# Patient Record
Sex: FEMALE | Race: BLACK OR AFRICAN AMERICAN | NOT HISPANIC OR LATINO | Employment: UNEMPLOYED | URBAN - METROPOLITAN AREA
[De-identification: names, ages, dates, MRNs, and addresses within clinical notes are randomized per-mention and may not be internally consistent; named-entity substitution may affect disease eponyms.]

---

## 2018-02-05 ENCOUNTER — HOSPITAL ENCOUNTER (EMERGENCY)
Facility: OTHER | Age: 23
Discharge: HOME | End: 2018-02-05
Attending: EMERGENCY MEDICINE
Payer: MEDICAID

## 2018-02-05 VITALS
DIASTOLIC BLOOD PRESSURE: 74 MMHG | OXYGEN SATURATION: 100 % | RESPIRATION RATE: 16 BRPM | TEMPERATURE: 98 F | HEART RATE: 86 BPM | HEIGHT: 60 IN | BODY MASS INDEX: 23.56 KG/M2 | SYSTOLIC BLOOD PRESSURE: 118 MMHG | WEIGHT: 120 LBS

## 2018-02-05 DIAGNOSIS — M54.2 NECK PAIN: ICD-10-CM

## 2018-02-05 DIAGNOSIS — S16.1XXA STRAIN OF NECK MUSCLE, INITIAL ENCOUNTER: Primary | ICD-10-CM

## 2018-02-05 DIAGNOSIS — M54.2 NECK PAIN ON RIGHT SIDE: ICD-10-CM

## 2018-02-05 LAB
B-HCG UR QL: NEGATIVE
CTP QC/QA: YES

## 2018-02-05 PROCEDURE — 99284 EMERGENCY DEPT VISIT MOD MDM: CPT | Mod: 25

## 2018-02-05 PROCEDURE — 25000003 PHARM REV CODE 250: Performed by: PHYSICIAN ASSISTANT

## 2018-02-05 PROCEDURE — 81025 URINE PREGNANCY TEST: CPT | Performed by: PHYSICIAN ASSISTANT

## 2018-02-05 RX ORDER — IBUPROFEN 400 MG/1
800 TABLET ORAL
Status: COMPLETED | OUTPATIENT
Start: 2018-02-05 | End: 2018-02-05

## 2018-02-05 RX ORDER — CYCLOBENZAPRINE HCL 10 MG
10 TABLET ORAL 3 TIMES DAILY PRN
Qty: 15 TABLET | Refills: 0 | Status: SHIPPED | OUTPATIENT
Start: 2018-02-05 | End: 2018-02-10

## 2018-02-05 RX ORDER — IBUPROFEN 800 MG/1
800 TABLET ORAL EVERY 6 HOURS PRN
Qty: 20 TABLET | Refills: 0 | Status: SHIPPED | OUTPATIENT
Start: 2018-02-05 | End: 2018-11-01

## 2018-02-05 RX ORDER — CYCLOBENZAPRINE HCL 10 MG
10 TABLET ORAL
Status: COMPLETED | OUTPATIENT
Start: 2018-02-05 | End: 2018-02-05

## 2018-02-05 RX ADMIN — CYCLOBENZAPRINE HYDROCHLORIDE 10 MG: 10 TABLET, FILM COATED ORAL at 11:02

## 2018-02-05 RX ADMIN — IBUPROFEN 800 MG: 400 TABLET, FILM COATED ORAL at 11:02

## 2018-02-05 NOTE — ED PROVIDER NOTES
"Encounter Date: 2/5/2018       History     Chief Complaint   Patient presents with    Neck Pain     + rigth sided neck pain " I was laying in bed and turned my head and felt a snap". Denies numbness/tingling to extremities at this time.     22-year-old female with no significant past medical history was a current every day smoker presents emergency department with complaints of right-sided neck pain.  She states it started this morning when she attempted to get out of bed.  She states that she felt a pop.  States the pain is worse when she looks towards her right as well as raising her left arm.  No current treatment.  She denies any numbness or weakness, trauma or injury.  She denies fever or chills or headache.  She states that her pain is a 10 out of 10.      The history is provided by the patient.     Review of patient's allergies indicates:   Allergen Reactions    Latex, natural rubber Hives     History reviewed. No pertinent past medical history.  History reviewed. No pertinent surgical history.  History reviewed. No pertinent family history.  Social History   Substance Use Topics    Smoking status: Current Every Day Smoker     Packs/day: 0.50    Smokeless tobacco: Not on file    Alcohol use No     Review of Systems   Constitutional: Negative for chills and fever.   HENT: Negative for sore throat.    Respiratory: Negative for shortness of breath.    Cardiovascular: Negative for chest pain.   Gastrointestinal: Negative for nausea and vomiting.   Genitourinary: Negative for dysuria.   Musculoskeletal: Positive for neck pain and neck stiffness. Negative for back pain.   Skin: Negative for rash.   Neurological: Negative for dizziness, syncope, weakness, light-headedness, numbness and headaches.   Hematological: Does not bruise/bleed easily.   Psychiatric/Behavioral: Negative for confusion.       Physical Exam     Initial Vitals [02/05/18 0918]   BP Pulse Resp Temp SpO2   118/74 86 16 97.6 °F (36.4 °C) 100 % "      MAP       88.67         Physical Exam    Nursing note and vitals reviewed.  Constitutional: Vital signs are normal. She appears well-developed and well-nourished. She is not diaphoretic.  Non-toxic appearance. No distress.   HENT:   Head: Normocephalic and atraumatic.   Right Ear: External ear normal.   Left Ear: External ear normal.   Nose: Nose normal.   Mouth/Throat: Oropharynx is clear and moist.   Eyes: Conjunctivae, EOM and lids are normal. Pupils are equal, round, and reactive to light. No scleral icterus.   Neck: Phonation normal. Neck supple. No spinous process tenderness and no muscular tenderness present. Decreased range of motion present. No neck rigidity.   Decreased range of motion secondary to muscle spasm.  No bony tenderness palpation or paraspinal muscle tenderness palpation.  She states the pain only occurs with movement.   Cardiovascular: Intact distal pulses and normal pulses. Exam reveals no decreased pulses.    Pulmonary/Chest: Effort normal and breath sounds normal. No respiratory distress. She has no decreased breath sounds. She has no wheezes. She has no rhonchi. She has no rales.   Abdominal: Normal appearance.   Musculoskeletal:   No obvious deformities, moving all extremities, normal gait  No midline tenderness palpation or step-offs of the cervical, thoracic or lumbar spine.  Intact distal pulses and no sensory deficits.  Strength 5 out of 5 bilateral upper and lower extremities.   Neurological: She is alert and oriented to person, place, and time. She has normal strength. She displays no atrophy. No sensory deficit. She exhibits normal muscle tone. Coordination and gait normal. GCS eye subscore is 4. GCS verbal subscore is 5. GCS motor subscore is 6.   Skin: Skin is warm, dry and intact. No abrasion, no bruising, no ecchymosis, no laceration, no lesion and no rash noted. No erythema.   Psychiatric: She has a normal mood and affect. Her speech is normal and behavior is normal.  Judgment normal. Cognition and memory are normal.         ED Course   Procedures  Labs Reviewed   POCT URINE PREGNANCY             Medical Decision Making:   History:   I obtained history from: someone other than patient.       <> Summary of History: Grandmother  Old Medical Records: I decided to obtain old medical records.  Initial Assessment:   22-year-old female with complaints consistent with strain of the muscles of the right side of the neck with associated pain.  Vital signs stable, afebrile, neurovascular intact.  She is alert, healthy and nontoxic appearing.  She is in no apparent distress.  Exam documented above.  This is not consistent with serious spectrum infection or meningismus.  I do believe it secondary to muscle strain/spasm.  I do not suspect fracture, dislocation or subluxation.  ED Management:  I do not feel that imaging is indicated and the patient states that she does not wish to wait for any imaging at this time.  She was administered ibuprofen and Flexeril in the emergency department and will be discharged home with prescription for ibuprofen and Flexeril.  She was also given care instructions.  She is to follow-up with her doctor in the next 48 hours or return for any worsening signs or symptoms.  She states understanding.  This patient was discussed with the attending physician who agrees with treatment plan.  Other:   I have discussed this case with another health care provider.       <> Summary of the Discussion: Robel  This note was created using Dragon Medical dictation.  There may be typographical errors secondary to dictation.                     ED Course      Clinical Impression:     1. Strain of neck muscle, initial encounter    2. Neck pain    3. Neck pain on right side          Disposition:   Disposition: Discharged  Condition: Stable                        Shelly Gabriel PA-C  02/05/18 1113

## 2018-02-05 NOTE — ED NOTES
Patient Identifiers for Kaylin Zelaya checked and correct  LOC: The patient is awake, alert and aware of environment with an appropriate affect, the patient is oriented x 3 and speaking appropriate.  APPEARANCE: Patient resting comfortably and in no acute distress. The patient is clean and well groomed. The patient's clothing is properly fastened.  SKIN: The skin is warm and dry. The patient has normal skin turgor and moist mucus membranes. No rashes or lesions upon observation. Skin Intact , no breakdown noted.  Musculoskeletal :  Normal range of motion noted. Moves all extremities well, right side neck pain  RESPIRATORY: Airway is open and patent, respirations are spontaneous, patient has a normal effort and rate. Breath sounds are clear & equal, bilaterally.  CARDIAC: Patient has a normal rate and rhythm, no peripheral edema noted, capillary refill < 3 seconds.   ABDOMEN: Soft and non tender to palpation, no distention observed. Bowels Sounds are WNL all quads.  PULSES: 2+ radial & pedal pulses, symmetrical in all extremities.  NEUROLOGIC: PERRL, Pupils 3mm and reacts briskly to light. Motor strength 5/5 all extremities.  The pt's facial expression is symmetrical, patient moving all extremities, normal sensation in all extremities when touched with a finger.The patient is awake, alert and cooperative with a calm affect, patient is aware of environment.      Will continue to monitor

## 2018-11-01 VITALS
DIASTOLIC BLOOD PRESSURE: 67 MMHG | WEIGHT: 125 LBS | TEMPERATURE: 99 F | RESPIRATION RATE: 16 BRPM | HEART RATE: 71 BPM | HEIGHT: 60 IN | SYSTOLIC BLOOD PRESSURE: 142 MMHG | OXYGEN SATURATION: 100 % | BODY MASS INDEX: 24.54 KG/M2

## 2018-11-01 PROCEDURE — 99283 EMERGENCY DEPT VISIT LOW MDM: CPT

## 2018-11-02 ENCOUNTER — HOSPITAL ENCOUNTER (EMERGENCY)
Facility: HOSPITAL | Age: 23
Discharge: HOME | End: 2018-11-02
Attending: EMERGENCY MEDICINE
Payer: MEDICAID
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
## 2018-11-02 DIAGNOSIS — S00.96XA INSECT BITE OF HEAD, INITIAL ENCOUNTER: Primary | ICD-10-CM

## 2018-11-02 PROCEDURE — 25000003 PHARM REV CODE 250: Performed by: EMERGENCY MEDICINE

## 2018-11-02 RX ORDER — IBUPROFEN 600 MG/1
600 TABLET ORAL EVERY 6 HOURS PRN
Qty: 20 TABLET | Refills: 0 | OUTPATIENT
Start: 2018-11-02 | End: 2019-07-12

## 2018-11-02 RX ORDER — DIPHENHYDRAMINE HCL 25 MG
25 CAPSULE ORAL
Status: DISCONTINUED | OUTPATIENT
Start: 2018-11-02 | End: 2018-11-02 | Stop reason: HOSPADM

## 2018-11-02 RX ORDER — IBUPROFEN 600 MG/1
600 TABLET ORAL
Status: COMPLETED | OUTPATIENT
Start: 2018-11-02 | End: 2018-11-02

## 2018-11-02 RX ADMIN — IBUPROFEN 600 MG: 600 TABLET, FILM COATED ORAL at 12:11

## 2018-11-02 NOTE — ED PROVIDER NOTES
"Encounter Date: 11/1/2018    SCRIBE #1 NOTE: I, Tristian Briggs, am scribing for, and in the presence of, Dr. Gomez.       History     Chief Complaint   Patient presents with    Insect Bite     c/o "knot" to left side of head-painful to touch     11/02/2018 12:30 AM    The pt is a 22 y.o. female presenting to the ED with a gradual onset of an acute insect bite occurring 3 days ago. The pt reported she woke up in the middle of her sleep and noticed a note on her head. Associated symptom of itching and numnbess. She stated symptoms are exacerbated with facial movement. The pt is a current cigarette smoker. She has no past medical history on file.       The history is provided by the patient.     Review of patient's allergies indicates:   Allergen Reactions    Latex, natural rubber Hives     History reviewed. No pertinent past medical history.  Past Surgical History:   Procedure Laterality Date    ADENOIDECTOMY      TONSILLECTOMY       History reviewed. No pertinent family history.  Social History     Tobacco Use    Smoking status: Current Every Day Smoker     Packs/day: 0.50   Substance Use Topics    Alcohol use: No    Drug use: Yes     Types: Marijuana     Review of Systems   Constitutional: Negative for fever.   HENT: Negative for congestion.    Eyes: Negative for visual disturbance.   Respiratory: Negative for wheezing.    Cardiovascular: Negative for chest pain.   Gastrointestinal: Negative for abdominal pain.   Genitourinary: Negative for dysuria.   Musculoskeletal: Negative for joint swelling.   Skin: Negative for rash.        + Itching  + Insect bite   Neurological: Positive for numbness. Negative for syncope.   Hematological: Does not bruise/bleed easily.   Psychiatric/Behavioral: Negative for confusion.       Physical Exam     Initial Vitals [11/01/18 2342]   BP Pulse Resp Temp SpO2   (!) 142/67 71 16 99.1 °F (37.3 °C) 100 %      MAP       --         Physical Exam    Nursing note and vitals " reviewed.  Constitutional: She appears well-nourished.   HENT:   0.5cm x 0.5cm lesion on the L parietal region with no erythema, swelling, fluctuation, and tenderness  The pt endorsed itching   Eyes: Conjunctivae and EOM are normal.   Neck: Normal range of motion. Neck supple. No thyroid mass present.   Cardiovascular: Normal rate, regular rhythm and normal heart sounds. Exam reveals no gallop and no friction rub.    No murmur heard.  Pulmonary/Chest: Breath sounds normal. She has no wheezes. She has no rhonchi. She has no rales.   Abdominal: Soft. Normal appearance and bowel sounds are normal. There is no tenderness.   Neurological: She is alert and oriented to person, place, and time. She has normal strength. No cranial nerve deficit or sensory deficit.   Skin: Skin is warm and dry. No rash noted. No erythema.   Psychiatric: She has a normal mood and affect. Her speech is normal. Cognition and memory are normal.         ED Course   Procedures  Labs Reviewed - No data to display       Imaging Results    None          Medical Decision Making:   History:   Old Medical Records: I decided to obtain old medical records.  ED Management:  Patient is exhibiting a small area that appears to be a mosquito bite.  No underlying fluctuance or additional findings concerning for cellulitis.  No indurated mass concerning for a cystic structure.  Patient is educated on supportive care for this and was provided Benadryl and Motrin here.  She is asked to follow up with the primary care doctor regarding expected improvement or to return to the ER for any new, concerning, or worsening symptoms.  Patient was agreeable with this plan for follow-up and she was discharged in stable condition.            Scribe Attestation:   Scribe #1: I performed the above scribed service and the documentation accurately describes the services I performed. I attest to the accuracy of the note.    I, Dr. Chaz Gomez, personally performed the services  described in this documentation. All medical record entries made by the scribe were at my direction and in my presence.  I have reviewed the chart and agree that the record reflects my personal performance and is accurate and complete. Chaz Gomez MD.  5:34 AM 11/02/2018             Clinical Impression:   The encounter diagnosis was Insect bite of head, initial encounter.      Disposition:   Disposition: Discharged  Condition: Stable                        Chaz Gomez MD  11/02/18 0534

## 2019-07-11 PROCEDURE — 96375 TX/PRO/DX INJ NEW DRUG ADDON: CPT

## 2019-07-11 PROCEDURE — 99285 EMERGENCY DEPT VISIT HI MDM: CPT | Mod: 25

## 2019-07-11 PROCEDURE — 96374 THER/PROPH/DIAG INJ IV PUSH: CPT

## 2019-07-12 ENCOUNTER — HOSPITAL ENCOUNTER (EMERGENCY)
Facility: HOSPITAL | Age: 24
Discharge: HOME | End: 2019-07-12
Attending: EMERGENCY MEDICINE
Payer: MEDICAID

## 2019-07-12 VITALS
SYSTOLIC BLOOD PRESSURE: 125 MMHG | DIASTOLIC BLOOD PRESSURE: 58 MMHG | TEMPERATURE: 99 F | HEIGHT: 61 IN | RESPIRATION RATE: 18 BRPM | OXYGEN SATURATION: 100 % | WEIGHT: 110 LBS | BODY MASS INDEX: 20.77 KG/M2 | HEART RATE: 63 BPM

## 2019-07-12 DIAGNOSIS — R42 LIGHTHEADEDNESS: ICD-10-CM

## 2019-07-12 DIAGNOSIS — R06.02 SOB (SHORTNESS OF BREATH): ICD-10-CM

## 2019-07-12 DIAGNOSIS — R11.2 NAUSEA AND VOMITING, INTRACTABILITY OF VOMITING NOT SPECIFIED, UNSPECIFIED VOMITING TYPE: ICD-10-CM

## 2019-07-12 DIAGNOSIS — R51.9 ACUTE NONINTRACTABLE HEADACHE, UNSPECIFIED HEADACHE TYPE: Primary | ICD-10-CM

## 2019-07-12 LAB
ALBUMIN SERPL BCP-MCNC: 4.6 G/DL (ref 3.5–5.2)
ALP SERPL-CCNC: 62 U/L (ref 55–135)
ALT SERPL W/O P-5'-P-CCNC: 13 U/L (ref 10–44)
ANION GAP SERPL CALC-SCNC: 13 MMOL/L (ref 8–16)
ANISOCYTOSIS BLD QL SMEAR: SLIGHT
AST SERPL-CCNC: 24 U/L (ref 10–40)
B-HCG UR QL: NEGATIVE
BASOPHILS # BLD AUTO: 0.02 K/UL (ref 0–0.2)
BASOPHILS NFR BLD: 0.3 % (ref 0–1.9)
BILIRUB SERPL-MCNC: 0.7 MG/DL (ref 0.1–1)
BILIRUB UR QL STRIP: NEGATIVE
BUN SERPL-MCNC: 9 MG/DL (ref 6–20)
CALCIUM SERPL-MCNC: 10.1 MG/DL (ref 8.7–10.5)
CHLORIDE SERPL-SCNC: 106 MMOL/L (ref 95–110)
CK SERPL-CCNC: 172 U/L (ref 20–180)
CLARITY UR: CLEAR
CO2 SERPL-SCNC: 22 MMOL/L (ref 23–29)
COLOR UR: YELLOW
CREAT SERPL-MCNC: 0.7 MG/DL (ref 0.5–1.4)
CTP QC/QA: YES
DIFFERENTIAL METHOD: ABNORMAL
EOSINOPHIL # BLD AUTO: 0 K/UL (ref 0–0.5)
EOSINOPHIL NFR BLD: 0.2 % (ref 0–8)
ERYTHROCYTE [DISTWIDTH] IN BLOOD BY AUTOMATED COUNT: 19.3 % (ref 11.5–14.5)
EST. GFR  (AFRICAN AMERICAN): >60 ML/MIN/1.73 M^2
EST. GFR  (NON AFRICAN AMERICAN): >60 ML/MIN/1.73 M^2
GIANT PLATELETS BLD QL SMEAR: PRESENT
GLUCOSE SERPL-MCNC: 71 MG/DL (ref 70–110)
GLUCOSE SERPL-MCNC: 85 MG/DL (ref 70–110)
GLUCOSE UR QL STRIP: NEGATIVE
HCT VFR BLD AUTO: 34 % (ref 37–48.5)
HGB BLD-MCNC: 10.5 G/DL (ref 12–16)
HGB UR QL STRIP: NEGATIVE
HYPOCHROMIA BLD QL SMEAR: ABNORMAL
KETONES UR QL STRIP: ABNORMAL
LEUKOCYTE ESTERASE UR QL STRIP: NEGATIVE
LYMPHOCYTES # BLD AUTO: 1 K/UL (ref 1–4.8)
LYMPHOCYTES NFR BLD: 15.2 % (ref 18–48)
MCH RBC QN AUTO: 22.2 PG (ref 27–31)
MCHC RBC AUTO-ENTMCNC: 30.9 G/DL (ref 32–36)
MCV RBC AUTO: 72 FL (ref 82–98)
MONOCYTES # BLD AUTO: 0.7 K/UL (ref 0.3–1)
MONOCYTES NFR BLD: 10.1 % (ref 4–15)
NEUTROPHILS # BLD AUTO: 4.9 K/UL (ref 1.8–7.7)
NEUTROPHILS NFR BLD: 74.4 % (ref 38–73)
NITRITE UR QL STRIP: NEGATIVE
OVALOCYTES BLD QL SMEAR: ABNORMAL
PH UR STRIP: 8 [PH] (ref 5–8)
PLATELET # BLD AUTO: 190 K/UL (ref 150–350)
PLATELET BLD QL SMEAR: ABNORMAL
PMV BLD AUTO: 10.3 FL (ref 9.2–12.9)
POIKILOCYTOSIS BLD QL SMEAR: SLIGHT
POTASSIUM SERPL-SCNC: 3.6 MMOL/L (ref 3.5–5.1)
PROT SERPL-MCNC: 8.5 G/DL (ref 6–8.4)
PROT UR QL STRIP: NEGATIVE
RBC # BLD AUTO: 4.73 M/UL (ref 4–5.4)
SCHISTOCYTES BLD QL SMEAR: PRESENT
SODIUM SERPL-SCNC: 141 MMOL/L (ref 136–145)
SP GR UR STRIP: 1.02 (ref 1–1.03)
TARGETS BLD QL SMEAR: ABNORMAL
URN SPEC COLLECT METH UR: ABNORMAL
UROBILINOGEN UR STRIP-ACNC: NEGATIVE EU/DL
WBC # BLD AUTO: 6.64 K/UL (ref 3.9–12.7)

## 2019-07-12 PROCEDURE — 81025 URINE PREGNANCY TEST: CPT | Performed by: EMERGENCY MEDICINE

## 2019-07-12 PROCEDURE — 25000003 PHARM REV CODE 250: Performed by: PHYSICIAN ASSISTANT

## 2019-07-12 PROCEDURE — 93010 EKG 12-LEAD: ICD-10-PCS | Mod: ,,, | Performed by: INTERNAL MEDICINE

## 2019-07-12 PROCEDURE — 81003 URINALYSIS AUTO W/O SCOPE: CPT

## 2019-07-12 PROCEDURE — 80053 COMPREHEN METABOLIC PANEL: CPT

## 2019-07-12 PROCEDURE — 93005 ELECTROCARDIOGRAM TRACING: CPT

## 2019-07-12 PROCEDURE — 85025 COMPLETE CBC W/AUTO DIFF WBC: CPT

## 2019-07-12 PROCEDURE — 63600175 PHARM REV CODE 636 W HCPCS: Performed by: PHYSICIAN ASSISTANT

## 2019-07-12 PROCEDURE — 82550 ASSAY OF CK (CPK): CPT

## 2019-07-12 PROCEDURE — 93010 ELECTROCARDIOGRAM REPORT: CPT | Mod: ,,, | Performed by: INTERNAL MEDICINE

## 2019-07-12 RX ORDER — BUTALBITAL, ACETAMINOPHEN AND CAFFEINE 50; 325; 40 MG/1; MG/1; MG/1
1 TABLET ORAL EVERY 4 HOURS PRN
Qty: 15 TABLET | Refills: 0 | Status: SHIPPED | OUTPATIENT
Start: 2019-07-12 | End: 2019-07-17

## 2019-07-12 RX ORDER — KETOROLAC TROMETHAMINE 30 MG/ML
15 INJECTION, SOLUTION INTRAMUSCULAR; INTRAVENOUS
Status: COMPLETED | OUTPATIENT
Start: 2019-07-12 | End: 2019-07-12

## 2019-07-12 RX ORDER — DOXYCYCLINE HYCLATE 100 MG
100 TABLET ORAL
Status: DISCONTINUED | OUTPATIENT
Start: 2019-07-12 | End: 2019-07-12

## 2019-07-12 RX ORDER — CEFTRIAXONE 250 MG/1
250 INJECTION, POWDER, FOR SOLUTION INTRAMUSCULAR; INTRAVENOUS
Status: DISCONTINUED | OUTPATIENT
Start: 2019-07-12 | End: 2019-07-12

## 2019-07-12 RX ORDER — DIPHENHYDRAMINE HYDROCHLORIDE 50 MG/ML
25 INJECTION INTRAMUSCULAR; INTRAVENOUS
Status: DISCONTINUED | OUTPATIENT
Start: 2019-07-12 | End: 2019-07-12

## 2019-07-12 RX ORDER — ONDANSETRON 4 MG/1
4 TABLET, ORALLY DISINTEGRATING ORAL EVERY 6 HOURS PRN
Qty: 15 TABLET | Refills: 0 | Status: SHIPPED | OUTPATIENT
Start: 2019-07-12 | End: 2019-07-17

## 2019-07-12 RX ORDER — IBUPROFEN 600 MG/1
600 TABLET ORAL EVERY 6 HOURS PRN
Qty: 20 TABLET | Refills: 0 | Status: SHIPPED | OUTPATIENT
Start: 2019-07-12 | End: 2019-07-17

## 2019-07-12 RX ORDER — PROCHLORPERAZINE EDISYLATE 5 MG/ML
10 INJECTION INTRAMUSCULAR; INTRAVENOUS
Status: COMPLETED | OUTPATIENT
Start: 2019-07-12 | End: 2019-07-12

## 2019-07-12 RX ORDER — HYDROXYZINE PAMOATE 25 MG/1
25 CAPSULE ORAL EVERY 6 HOURS PRN
Qty: 20 CAPSULE | Refills: 0 | Status: SHIPPED | OUTPATIENT
Start: 2019-07-12 | End: 2019-07-17

## 2019-07-12 RX ORDER — DIPHENHYDRAMINE HYDROCHLORIDE 50 MG/ML
50 INJECTION INTRAMUSCULAR; INTRAVENOUS
Status: COMPLETED | OUTPATIENT
Start: 2019-07-12 | End: 2019-07-12

## 2019-07-12 RX ADMIN — KETOROLAC TROMETHAMINE 15 MG: 30 INJECTION, SOLUTION INTRAMUSCULAR at 03:07

## 2019-07-12 RX ADMIN — DIPHENHYDRAMINE HYDROCHLORIDE 50 MG: 50 INJECTION INTRAMUSCULAR; INTRAVENOUS at 03:07

## 2019-07-12 RX ADMIN — PROCHLORPERAZINE EDISYLATE 10 MG: 5 INJECTION INTRAMUSCULAR; INTRAVENOUS at 03:07

## 2019-07-12 RX ADMIN — SODIUM CHLORIDE 1000 ML: 0.9 INJECTION, SOLUTION INTRAVENOUS at 03:07

## 2019-07-12 NOTE — ED PROVIDER NOTES
Encounter Date: 7/11/2019       History     Chief Complaint   Patient presents with    Headache     pt reports occipital & frontal headache ongoing for 1hr; pt reports that she went to the store to get medication for the headache and began feeling generalized weakness, sweaty, hot, shaky, & vomited 3 times just PTA; pt denies pain anywhere other than the headache; pt currently restless in wheelchair; pt denies hx of these symptoms; pt reports feeling normal and eating normal earlier today    Weakness     CC: Headache, Fatigue  HPI:   23-year-old female with family history of migraine headaches presenting for evaluation of constant 10/10 generalized HA that began 4.5 hours prior to arrival with associated photophobia, nausea and vomiting x 7 and fatigue. She reports she became diaphoretic, began shaking and had tingling to her fingertips bilaterally when she was feeling SOB and hyperventilating which prompted her to present to ED. Denies phonophobia, visual disturbance, trauma, URI symptoms, urinary symptoms. No attempted tx.         Review of patient's allergies indicates:   Allergen Reactions    Latex, natural rubber Hives     History reviewed. No pertinent past medical history.  Past Surgical History:   Procedure Laterality Date    ADENOIDECTOMY      NOSE SURGERY      TONSILLECTOMY       History reviewed. No pertinent family history.  Social History     Tobacco Use    Smoking status: Current Every Day Smoker     Packs/day: 0.50   Substance Use Topics    Alcohol use: No    Drug use: Yes     Types: Marijuana     Review of Systems   Constitutional: Positive for fatigue. Negative for fever.   HENT: Negative for congestion, ear pain, rhinorrhea and sore throat.    Eyes: Negative for redness and visual disturbance.   Respiratory: Positive for shortness of breath.    Cardiovascular: Negative for chest pain.   Gastrointestinal: Positive for nausea and vomiting. Negative for abdominal pain and diarrhea.    Genitourinary: Negative for dysuria, frequency, hematuria and urgency.   Musculoskeletal: Negative for back pain, neck pain and neck stiffness.   Skin: Negative for rash.   Neurological: Positive for light-headedness and headaches. Negative for dizziness, speech difficulty, weakness and numbness.        (+) tingling      Psychiatric/Behavioral: Negative for confusion.       Physical Exam     Initial Vitals [07/12/19 0004]   BP Pulse Resp Temp SpO2   137/81 68 20 97.5 °F (36.4 °C) 100 %      MAP       --         Physical Exam    Nursing note and vitals reviewed.  Constitutional: She appears well-developed and well-nourished.   Pt laying down in dark room    HENT:   Head: Normocephalic.   Right Ear: Hearing, tympanic membrane, external ear and ear canal normal.   Left Ear: Hearing, tympanic membrane, external ear and ear canal normal.   Nose: Nose normal. Right sinus exhibits no maxillary sinus tenderness and no frontal sinus tenderness. Left sinus exhibits no maxillary sinus tenderness and no frontal sinus tenderness.   Mouth/Throat: Uvula is midline, oropharynx is clear and moist and mucous membranes are normal. No oropharyngeal exudate, posterior oropharyngeal edema or posterior oropharyngeal erythema.   Eyes: Conjunctivae, EOM and lids are normal. Pupils are equal, round, and reactive to light.   Photophobia     Cardiovascular: Normal rate and regular rhythm. Exam reveals no gallop and no friction rub.    No murmur heard.  Pulmonary/Chest: Breath sounds normal. She has no wheezes. She has no rhonchi. She has no rales.   Abdominal: Soft. Bowel sounds are normal. She exhibits no distension. There is no tenderness. There is no rebound, no guarding, no tenderness at McBurney's point and negative Olivares's sign.   Musculoskeletal: Normal range of motion.   Lymphadenopathy:     She has no cervical adenopathy.   Neurological: She is alert. She has normal strength. No cranial nerve deficit or sensory deficit.  Coordination and gait normal.   Skin: Skin is warm and dry.   Psychiatric: She has a normal mood and affect.         ED Course   Procedures  Labs Reviewed   CBC W/ AUTO DIFFERENTIAL - Abnormal; Notable for the following components:       Result Value    Hemoglobin 10.5 (*)     Hematocrit 34.0 (*)     Mean Corpuscular Volume 72 (*)     Mean Corpuscular Hemoglobin 22.2 (*)     Mean Corpuscular Hemoglobin Conc 30.9 (*)     RDW 19.3 (*)     Gran% 74.4 (*)     Lymph% 15.2 (*)     All other components within normal limits   COMPREHENSIVE METABOLIC PANEL - Abnormal; Notable for the following components:    CO2 22 (*)     Total Protein 8.5 (*)     All other components within normal limits   URINALYSIS, REFLEX TO URINE CULTURE - Abnormal; Notable for the following components:    Ketones, UA 1+ (*)     All other components within normal limits    Narrative:     Preferred Collection Type->Urine, Clean Catch   CK   POCT URINE PREGNANCY        ECG Results          EKG 12-lead (In process)  Result time 07/12/19 05:55:11    In process by Interface, Lab In [facility] (07/12/19 05:55:11)                 Narrative:    Test Reason : R06.02,    Vent. Rate : 066 BPM     Atrial Rate : 066 BPM     P-R Int : 102 ms          QRS Dur : 090 ms      QT Int : 418 ms       P-R-T Axes : 099 128 112 degrees     QTc Int : 438 ms     Suspect arm lead reversal, interpretation assumes no reversal  Sinus rhythm with short SC  Left posterior fascicular block  T wave abnormality, consider anterior ischemia  Abnormal ECG  When compared with ECG of 31-MAY-2012 15:36,  Significant changes have occurred    Referred By: [email or web address]   SELF           Confirmed By:                   In process by Interface, Lab In [facility] (07/12/19 05:50:08)                 Narrative:    Test Reason : R06.02,    Vent. Rate : 066 BPM     Atrial Rate : 066 BPM     P-R Int : 102 ms          QRS Dur : 090 ms      QT Int : 418 ms       P-R-T Axes : 099 128 112 degrees     QTc Int : 438  ms      Suspect arm lead reversal, interpretation assumes no reversal  Sinus rhythm with short SC  Left posterior fascicular block  T wave abnormality, consider anterior ischemia  Abnormal ECG  When compared with ECG of 31-MAY-2012 15:36,  Significant changes have occurred    Referred By: PRINCE   SELF           Confirmed By:                             Imaging Results    None          Medical Decision Making:   Initial Assessment:   23-year-old female presenting for evaluation of generalized headache with associated nausea and vomiting. She reports she is unsure about, began hyperventilating and became diaphoretic and lightheaded earlier today patient is afebrile appears pills to take.  She is today.  Exam above.  No focal neurologic deficits.  Sinus tenderness.  Labs unremarkable. Patient given IV Benadryl, Toradol Compazine in the emergency department with full resolution of her headache.  Think this is likely a migraine headache. Patient instructed history in her mother, grandmother, aunt and uncle's.  LIkely anxiety contributing to patient's near syncopal episode with hyperventilating and lightheaded. Will discharge with vistaril as well. Follow up with Neurology for further evaluation management.  Return to ER for worsening symptoms or as needed.                      Clinical Impression:       ICD-10-CM ICD-9-CM   1. Acute nonintractable headache, unspecified headache type R51 784.0   2. SOB (shortness of breath) R06.02 786.05   3. Nausea and vomiting, intractability of vomiting not specified, unspecified vomiting type R11.2 787.01   4. Lightheadedness R42 780.4                                Otilia Rosario PA-C  07/13/19 0311

## 2019-07-12 NOTE — DISCHARGE INSTRUCTIONS
Take ibuprofen and Fioricet as prescribed for headache. Take Zofran for nausea.  Vistaril for anxiety.  Follow up with Neurology in 2 days and return emergency department for worsening symptoms or as needed

## 2019-07-12 NOTE — ED NOTES
Pt now reporting she was also feeling SOB; pt SpO2 100% on RA, no distress noted; pt brought to triage room for EKG

## 2019-07-12 NOTE — ED TRIAGE NOTES
Patient reports a headache that began approximately 3 hours PTA while in [facility].  Patient also reports bilateral hand numbness, unsure of when the symptoms began.  Patient reports N/V x 3 PTA.  Denies fever.  Patient reports testing her glucose and it was 70 when first arriving to ED.  Denies visual changes but does report some sensitivity to light since the headache began.

## 2021-10-28 ENCOUNTER — OFFICE VISIT (OUTPATIENT)
Dept: URGENT CARE | Facility: CLINIC | Age: 26
End: 2021-10-28
Payer: MEDICAID

## 2021-10-28 VITALS
TEMPERATURE: 99 F | HEIGHT: 61 IN | SYSTOLIC BLOOD PRESSURE: 123 MMHG | OXYGEN SATURATION: 98 % | HEART RATE: 76 BPM | BODY MASS INDEX: 23.6 KG/M2 | DIASTOLIC BLOOD PRESSURE: 78 MMHG | WEIGHT: 125 LBS | RESPIRATION RATE: 16 BRPM

## 2021-10-28 DIAGNOSIS — M79.642 LEFT HAND PAIN: ICD-10-CM

## 2021-10-28 DIAGNOSIS — S69.92XA FINGER INJURY, LEFT, INITIAL ENCOUNTER: ICD-10-CM

## 2021-10-28 DIAGNOSIS — S62.637B OPEN DISPLACED FRACTURE OF DISTAL PHALANX OF LEFT LITTLE FINGER, INITIAL ENCOUNTER: Primary | ICD-10-CM

## 2021-10-28 PROCEDURE — 99203 OFFICE O/P NEW LOW 30 MIN: CPT | Mod: S$GLB,,, | Performed by: NURSE PRACTITIONER

## 2021-10-28 PROCEDURE — 80305 PR DRUG TEST, PRESUMP,ANY NUMBER OF CLASS, DIRECT OPTICAL OBS: ICD-10-PCS | Mod: QW,S$GLB,, | Performed by: NURSE PRACTITIONER

## 2021-10-28 PROCEDURE — 80305 DRUG TEST PRSMV DIR OPT OBS: CPT | Mod: QW,S$GLB,, | Performed by: NURSE PRACTITIONER

## 2021-10-28 PROCEDURE — 73130 X-RAY EXAM OF HAND: CPT | Mod: LT,S$GLB,, | Performed by: RADIOLOGY

## 2021-10-28 PROCEDURE — 73130 XR HAND COMPLETE 3 VIEW LEFT: ICD-10-PCS | Mod: LT,S$GLB,, | Performed by: RADIOLOGY

## 2021-10-28 PROCEDURE — 99203 PR OFFICE/OUTPT VISIT, NEW, LEVL III, 30-44 MIN: ICD-10-PCS | Mod: S$GLB,,, | Performed by: NURSE PRACTITIONER

## 2025-01-20 ENCOUNTER — HOSPITAL ENCOUNTER (EMERGENCY)
Facility: HOSPITAL | Age: 30
Discharge: HOME | End: 2025-01-20
Attending: EMERGENCY MEDICINE

## 2025-01-20 VITALS
OXYGEN SATURATION: 100 % | HEART RATE: 87 BPM | RESPIRATION RATE: 18 BRPM | HEIGHT: 60 IN | WEIGHT: 130 LBS | SYSTOLIC BLOOD PRESSURE: 133 MMHG | TEMPERATURE: 100 F | BODY MASS INDEX: 25.52 KG/M2 | DIASTOLIC BLOOD PRESSURE: 84 MMHG

## 2025-01-20 DIAGNOSIS — R05.9 COUGH: ICD-10-CM

## 2025-01-20 DIAGNOSIS — S93.602A FOOT SPRAIN, LEFT, INITIAL ENCOUNTER: Primary | ICD-10-CM

## 2025-01-20 DIAGNOSIS — M79.672 LEFT FOOT PAIN: ICD-10-CM

## 2025-01-20 LAB
B-HCG UR QL: NEGATIVE
CTP QC/QA: YES

## 2025-01-20 PROCEDURE — 86803 HEPATITIS C AB TEST: CPT | Performed by: EMERGENCY MEDICINE

## 2025-01-20 PROCEDURE — 99285 EMERGENCY DEPT VISIT HI MDM: CPT | Mod: 25

## 2025-01-20 PROCEDURE — 25000003 PHARM REV CODE 250

## 2025-01-20 PROCEDURE — 96372 THER/PROPH/DIAG INJ SC/IM: CPT

## 2025-01-20 PROCEDURE — 87389 HIV-1 AG W/HIV-1&-2 AB AG IA: CPT | Performed by: EMERGENCY MEDICINE

## 2025-01-20 PROCEDURE — 81025 URINE PREGNANCY TEST: CPT

## 2025-01-20 PROCEDURE — 29515 APPLICATION SHORT LEG SPLINT: CPT | Mod: LT

## 2025-01-20 PROCEDURE — 36415 COLL VENOUS BLD VENIPUNCTURE: CPT | Performed by: EMERGENCY MEDICINE

## 2025-01-20 PROCEDURE — 63600175 PHARM REV CODE 636 W HCPCS: Mod: JW,TB

## 2025-01-20 RX ORDER — KETOROLAC TROMETHAMINE 30 MG/ML
15 INJECTION, SOLUTION INTRAMUSCULAR; INTRAVENOUS
Status: COMPLETED | OUTPATIENT
Start: 2025-01-20 | End: 2025-01-20

## 2025-01-20 RX ORDER — NAPROXEN 500 MG/1
500 TABLET ORAL 2 TIMES DAILY WITH MEALS
Qty: 14 TABLET | Refills: 0 | Status: SHIPPED | OUTPATIENT
Start: 2025-01-20 | End: 2025-01-27

## 2025-01-20 RX ORDER — ACETAMINOPHEN 500 MG
1000 TABLET ORAL
Status: COMPLETED | OUTPATIENT
Start: 2025-01-20 | End: 2025-01-20

## 2025-01-20 RX ADMIN — ACETAMINOPHEN 1000 MG: 500 TABLET ORAL at 10:01

## 2025-01-20 RX ADMIN — KETOROLAC TROMETHAMINE 15 MG: 30 INJECTION, SOLUTION INTRAMUSCULAR at 08:01

## 2025-01-20 NOTE — Clinical Note
"Kaylin Chiangmarko Zelaya was seen and treated in our emergency department on 1/20/2025.  She may return to work on 01/24/2025.       If you have any questions or concerns, please don't hesitate to call.      Lesley Reddy NP"

## 2025-01-21 NOTE — DISCHARGE INSTRUCTIONS
Alternate Tylenol with the naproxen as needed for pain.  Elevate the extremity at home and do ice to the area.  The splint can not come off and can not get wet until you follow up with Orthopedics.  Please use the crutches to help ambulate.  Please follow up with [facility] for primary care and further evaluation and treatment.    Please return to the ED for worsening pain not controlled with medication, numbness or tingling of the foot or toes, discoloration of the toes, fever, or any new or worsening concerns.

## 2025-01-21 NOTE — ED PROVIDER NOTES
Encounter Date: 1/20/2025       History     Chief Complaint   Patient presents with    Foot Injury     Reports was dancing and heard a pop to left foot      Patient is a 29 y.o. female with no significant past medical history who presents to ED via family for concern for foot injury which began around 2:00 a.m..  Patient reports she was dancing when she heard a pop in her left foot.  Patient reports she has been unable to bear weight on the foot since the fall.  Patient denies taking any medications at home for pain.  Patient endorses a cough since December 27th that has been the same.  Patient denies chest pain or shortness breath.  Patient denies fever.  Patient reports she is smokes cigarettes.  Patient is awake and alert in no acute distress.      Review of patient's allergies indicates:   Allergen Reactions    Iodine Swelling    Opioids - morphine analogues Hives    Latex, natural rubber Hives     History reviewed. No pertinent past medical history.  Past Surgical History:   Procedure Laterality Date    ADENOIDECTOMY      NOSE SURGERY      TONSILLECTOMY       No family history on file.  Social History     Tobacco Use    Smoking status: Every Day     Current packs/day: 0.50     Types: Cigarettes   Substance Use Topics    Alcohol use: No    Drug use: Yes     Types: Marijuana     Review of Systems   Constitutional: Negative.  Negative for fever.   HENT: Negative.     Respiratory:  Positive for cough. Negative for apnea, choking, chest tightness, shortness of breath, wheezing and stridor.    Cardiovascular:  Negative for chest pain.   Gastrointestinal:  Negative for vomiting.   Musculoskeletal:  Positive for arthralgias and myalgias. Negative for back pain.   Skin:  Negative for color change, pallor and rash.   Neurological:  Negative for weakness.   Hematological:  Does not bruise/bleed easily.       Physical Exam     Initial Vitals [01/20/25 1808]   BP Pulse Resp Temp SpO2   133/84 87 18 99.6 °F (37.6 °C) 100 %       MAP       --         Physical Exam    Nursing note and vitals reviewed.  Constitutional: She appears well-developed and well-nourished. She is not diaphoretic. No distress.   HENT:   Head: Normocephalic and atraumatic.   Right Ear: External ear normal.   Left Ear: External ear normal.   Nose: Nose normal.   Eyes: EOM are normal.   Neck:   Normal range of motion.  Cardiovascular:  Normal rate, regular rhythm, normal heart sounds and intact distal pulses.     Exam reveals no gallop and no friction rub.       No murmur heard.  Pulmonary/Chest: Breath sounds normal. No respiratory distress. She has no wheezes. She has no rhonchi. She has no rales. She exhibits no tenderness.   Musculoskeletal:      Cervical back: Normal range of motion.      Left lower leg: Normal.      Left ankle: Normal.      Left Achilles Tendon: Normal. No tenderness.      Left foot: Normal range of motion and normal capillary refill. Swelling, tenderness and bony tenderness present. No deformity, foot drop, laceration or crepitus. Normal pulse.     Neurological: She is alert and oriented to person, place, and time. She has normal strength. GCS score is 15. GCS eye subscore is 4. GCS verbal subscore is 5. GCS motor subscore is 6.   Skin: Skin is warm and dry. Capillary refill takes less than 2 seconds.   Psychiatric: She has a normal mood and affect. Her behavior is normal. Judgment and thought content normal.         ED Course   Splint Application    Date/Time: 1/20/2025 9:00 PM    Performed by: Lesley Reddy NP  Authorized by: Chris Lou MD  Consent Done: Yes  Consent: Verbal consent obtained.  Risks and benefits: risks, benefits and alternatives were discussed  Consent given by: patient  Patient understanding: patient states understanding of the procedure being performed  Patient identity confirmed: verbally with patient  Location: Left foot.  Splint type: short leg  Supplies used: cotton padding, elastic bandage and  Ortho-Glass  Post-procedure: The splinted body part was neurovascularly unchanged following the procedure.  Patient tolerance: Patient tolerated the procedure well with no immediate complications        Labs Reviewed   HEPATITIS C ANTIBODY   HIV 1 / 2 ANTIBODY   POCT URINE PREGNANCY       Result Value    POC Preg Test, Ur Negative       Acceptable Yes            Imaging Results              US Lower Extremity Veins Left (Final result)  Result time 01/20/25 21:23:53      Final result by Benny Lynch MD (01/20/25 21:23:53)                   Impression:      No evidence of deep venous thrombosis in the left lower extremity.      Electronically signed by: Benny Lynch  Date:    01/20/2025  Time:    21:23               Narrative:    EXAMINATION:  US LOWER EXTREMITY VEINS LEFT    CLINICAL HISTORY:  Other specified soft tissue disorders    TECHNIQUE:  Duplex and color flow Doppler evaluation and graded compression of the left lower extremity veins was performed.    COMPARISON:  None    FINDINGS:  Left thigh veins: The common femoral, femoral, popliteal, upper greater saphenous, and deep femoral veins are patent and free of thrombus. The veins are normally compressible and have normal phasic flow and augmentation response.    Left calf veins: The visualized calf veins are patent.    Miscellaneous: None                                       X-Ray Chest PA And Lateral (Final result)  Result time 01/20/25 20:54:38      Final result by Benny Lynch MD (01/20/25 20:54:38)                   Impression:      No acute findings      Electronically signed by: Benny Lynch  Date:    01/20/2025  Time:    20:54               Narrative:    EXAMINATION:  XR CHEST PA AND LATERAL    CLINICAL HISTORY:  Cough, unspecified    TECHNIQUE:  PA and lateral views of the chest were performed.    COMPARISON:  None    FINDINGS:  Lungs are clear. No focal consolidation. No pleural effusion. No  pneumothorax. Normal heart size.                                       X-Ray Ankle Complete Left (Final result)  Result time 01/20/25 20:36:34      Final result by Benny Lynch MD (01/20/25 20:36:34)                   Impression:      No acute findings      Electronically signed by: Benny Lynch  Date:    01/20/2025  Time:    20:36               Narrative:    EXAMINATION:  XR ANKLE COMPLETE 3 VIEW LEFT    CLINICAL HISTORY:  Injury, unspecified, initial encounter    TECHNIQUE:  AP, lateral and oblique views of the left ankle were performed.    COMPARISON:  None    FINDINGS:  No acute fracture, dislocation, or traumatic malalignment.  Joint spaces are maintained.  Ankle swelling.                                       X-Ray Foot Complete Left (Final result)  Result time 01/20/25 20:35:45      Final result by Benny Lynch MD (01/20/25 20:35:45)                   Impression:      As above      Electronically signed by: Benny Lynch  Date:    01/20/2025  Time:    20:35               Narrative:    EXAMINATION:  XR FOOT COMPLETE 3 VIEW LEFT    CLINICAL HISTORY:  .  Injury, unspecified, initial encounter    TECHNIQUE:  AP, lateral and oblique views of the left foot were performed.    COMPARISON:  None    FINDINGS:  No acute fracture or dislocation.  Forefoot swelling.                                       Medications   ketorolac injection 15 mg (15 mg Intramuscular Given 1/20/25 2015)   acetaminophen tablet 1,000 mg (1,000 mg Oral Given 1/20/25 2200)     Medical Decision Making  MDM    Patient presents for emergent evaluation of acute foot injury that poses a possible threat to life and/or bodily function.    Differential diagnosis included but not limited to fracture, dislocation, sprain, DVT, pneumonia.  In the ED patient found to have acute clear lung sounds bilaterally with no increased work of breathing.  Patient has swelling noted to the top of the left foot with pain to palpation over  the top of the foot.  Patient has a normal pulses, cap refill, and sensation distally.  Patient has a strong left pedal pulse and strong left posterior tibialis pulse.  Patient has a no pain in his swelling noted over the ankle.  Patient has normal range of motion of the ankle.  Patient has a no pain on palpation in the Achilles standing, calf, or shin.  No other leg swelling seen.    X-ray left foot significant for no acute fracture or dislocation, forefoot swelling.    X-ray chest significant for no acute findings.  X-ray left ankle significant for no acute findings  Ultrasound left lower extremity significant for no evidence of DVT.    Findings consistent with a sprain.  Due to patient having swelling of the foot as well as inability to bear weight, patient is placed in a posterior short-leg splint and given crutches and instructed to have close follow up with Orthopedics as soon as possible.  Patient neurovascularly intact after splint placement.    Discharge MDM    Patient was managed in the ED with IM Toradol and oral Tylenol.  Due to patient having a hives allergy to morphine and never taking other narcotics before, discussed with the patient that we would not prescribe her narcotics at this time as there is possibility she could have a cross reaction..    The response to treatment was good.    Patient was discharged in stable condition with close follow up with Orthopedics.  Detailed return precautions discussed to return to the ED for any new or worsening concerns.  Patient verbalizes understanding.    NP uses Epic and voice recognition software prone to occasional and minor errors that may persist in the medical record.      Amount and/or Complexity of Data Reviewed  Labs: ordered. Decision-making details documented in ED Course.  Radiology: ordered. Decision-making details documented in ED Course.    Risk  OTC drugs.  Prescription drug management.               ED Course as of 01/21/25 0119   Mon Jan 20,  2025 2003 hCG Qualitative, Urine: Negative [MP]   2039 X-Ray Foot Complete Left     FINDINGS:  No acute fracture or dislocation.  Forefoot swelling.   [MP]   2039 X-Ray Ankle Complete Left  Impression:     No acute findings   [MP]   2057 X-Ray Chest PA And Lateral  Impression:     No acute findings   [MP]   2144 US Lower Extremity Veins Left  Impression:     No evidence of deep venous thrombosis in the left lower extremity.   [MP]      ED Course User Index  [MP] Lesley Reddy NP                           Clinical Impression:  Final diagnoses:  [R05.9] Cough  [S93.602A] Foot sprain, left, initial encounter (Primary)  [M79.672] Left foot pain          ED Disposition Condition    Discharge Stable          ED Prescriptions       Medication Sig Dispense Start Date End Date Auth. Provider    naproxen (NAPROSYN) 500 MG tablet Take 1 tablet (500 mg total) by mouth 2 (two) times daily with meals. for 7 days 14 tablet 1/20/2025 1/27/2025 Lesley Reddy NP          Follow-up Information       Follow up With Specialties Details Why Contact Info Additional Information    [facility]  Schedule an appointment as soon as possible for a visit  For primary care, For recheck/continuing care [address] [facility] [address]  [phone]       Jorgito Subramanian MD Orthopedic Surgery Schedule an appointment as soon as possible for a visit  For recheck/continuing care [address] [facility] [address]  [phone]       [facility] - Emergency Dept Emergency Medicine  If symptoms worsen [address] [facility] [id number] [phone] 1st floor             Lesley Reddy NP  01/21/25 0119

## 2025-01-23 LAB
HCV AB SERPL QL IA: NEGATIVE
HIV 1+2 AB+HIV1 P24 AG SERPL QL IA: NEGATIVE

## 2025-01-24 ENCOUNTER — TELEPHONE (OUTPATIENT)
Dept: ORTHOPEDICS | Facility: CLINIC | Age: 30
End: 2025-01-24
Payer: COMMERCIAL

## 2025-01-31 ENCOUNTER — OFFICE VISIT (OUTPATIENT)
Dept: ORTHOPEDICS | Facility: CLINIC | Age: 30
End: 2025-01-31

## 2025-01-31 VITALS — HEIGHT: 60 IN | BODY MASS INDEX: 25.97 KG/M2 | WEIGHT: 132.25 LBS

## 2025-01-31 DIAGNOSIS — S93.602A FOOT SPRAIN, LEFT, INITIAL ENCOUNTER: ICD-10-CM

## 2025-01-31 DIAGNOSIS — M79.672 LEFT FOOT PAIN: ICD-10-CM

## 2025-01-31 PROCEDURE — 99999 PR PBB SHADOW E&M-EST. PATIENT-LVL III: CPT | Mod: PBBFAC,,, | Performed by: ORTHOPAEDIC SURGERY

## 2025-01-31 NOTE — PROGRESS NOTES
Subjective:      Patient ID: Kaylin Zelaya is a 29 y.o. female.    Chief Complaint: Injury and Pain of the Left Foot (DOI: 01/19/2025- dancing and fell )    HPI  29-year-old female with a 2 week history of left foot pain.  She was dancing felt a pop had acute pain was seen in the emergency department placed into a splint and referred for further evaluation.  Denies any other complaints or prior problems.  Pain has improved a bit with relative rest.  ROS      Objective:    Ortho Exam     Constitutional:   Patient is alert  and oriented in no acute distress  HEENT:  normocephalic atraumatic; PERRL EOMI  Neck:  Supple without adenopathy  Cardiovascular:  Normal rate and rhythm  Pulmonary:  Normal respiratory effort normal chest wall expansion  Abdominal:  Nonprotuberant nondistended  Musculoskeletal:  Patient has some pes planus diffuse  Midfoot tenderness Good ankle range of motion  Intact skin, sensation, and brisk capillary refill of the digits  Neurological:  No focal defect; cranial nerves 2-12 grossly intact  Psychiatric/behavioral:  Mood and behavior normal           My Radiographs Findings:    Radiographs of the left foot reviewed without acute osseous abnormalities  Assessment:       Encounter Diagnoses   Name Primary?    Foot sprain, left, initial encounter     Left foot pain          Plan:       I have discussed medical condition treatment options with her at length she may have sustained a mild midfoot sprain.  We will get him into a walking boot we have discussed rehab exercises activity restrictions anti-inflammatory medicines and follow up with me in 2-3 weeks if any residual symptoms otherwise at that point she may advance activities as tolerated and follow up as needed.        History reviewed. No pertinent past medical history.  Past Surgical History:   Procedure Laterality Date    ADENOIDECTOMY      NOSE SURGERY      TONSILLECTOMY           Current Outpatient Medications:     ibuprofen  (ADVIL,MOTRIN) 600 MG tablet, Take 1 tablet (600 mg total) by mouth every 6 (six) hours as needed for Pain., Disp: 20 tablet, Rfl: 0    Review of patient's allergies indicates:   Allergen Reactions    Iodine Swelling    Opioids - morphine analogues Hives    Latex, natural rubber Hives       No family history on file.  Social History     Occupational History    Not on file   Tobacco Use    Smoking status: Every Day     Current packs/day: 0.50     Types: Cigarettes     Passive exposure: Current    Smokeless tobacco: Not on file   Substance and Sexual Activity    Alcohol use: No    Drug use: Yes     Types: Marijuana    Sexual activity: Not on file